# Patient Record
Sex: MALE | Race: WHITE | NOT HISPANIC OR LATINO | Employment: FULL TIME | URBAN - METROPOLITAN AREA
[De-identification: names, ages, dates, MRNs, and addresses within clinical notes are randomized per-mention and may not be internally consistent; named-entity substitution may affect disease eponyms.]

---

## 2023-10-14 ENCOUNTER — HOSPITAL ENCOUNTER (EMERGENCY)
Facility: HOSPITAL | Age: 63
Discharge: HOME | End: 2023-10-14
Attending: EMERGENCY MEDICINE
Payer: MEDICARE

## 2023-10-14 ENCOUNTER — APPOINTMENT (OUTPATIENT)
Dept: RADIOLOGY | Facility: HOSPITAL | Age: 63
End: 2023-10-14
Payer: MEDICARE

## 2023-10-14 VITALS
OXYGEN SATURATION: 99 % | DIASTOLIC BLOOD PRESSURE: 64 MMHG | HEART RATE: 67 BPM | WEIGHT: 190.26 LBS | RESPIRATION RATE: 16 BRPM | BODY MASS INDEX: 28.18 KG/M2 | HEIGHT: 69 IN | SYSTOLIC BLOOD PRESSURE: 123 MMHG

## 2023-10-14 DIAGNOSIS — N20.0 KIDNEY STONE: Primary | ICD-10-CM

## 2023-10-14 LAB
ALBUMIN SERPL-MCNC: 4.5 G/DL (ref 3.5–5)
ALP BLD-CCNC: 104 U/L (ref 35–125)
ALT SERPL-CCNC: 30 U/L (ref 5–40)
ANION GAP SERPL CALC-SCNC: 11 MMOL/L
APPEARANCE UR: ABNORMAL
AST SERPL-CCNC: 21 U/L (ref 5–40)
BASOPHILS # BLD AUTO: 0.03 X10*3/UL (ref 0–0.1)
BASOPHILS NFR BLD AUTO: 0.3 %
BILIRUB SERPL-MCNC: 0.3 MG/DL (ref 0.1–1.2)
BILIRUB UR STRIP.AUTO-MCNC: NEGATIVE MG/DL
BUN SERPL-MCNC: 9 MG/DL (ref 8–25)
CALCIUM SERPL-MCNC: 9.3 MG/DL (ref 8.5–10.4)
CHLORIDE SERPL-SCNC: 101 MMOL/L (ref 97–107)
CO2 SERPL-SCNC: 26 MMOL/L (ref 24–31)
COLOR UR: YELLOW
CREAT SERPL-MCNC: 1 MG/DL (ref 0.4–1.6)
EOSINOPHIL # BLD AUTO: 0.05 X10*3/UL (ref 0–0.7)
EOSINOPHIL NFR BLD AUTO: 0.6 %
ERYTHROCYTE [DISTWIDTH] IN BLOOD BY AUTOMATED COUNT: 12.5 % (ref 11.5–14.5)
GFR SERPL CREATININE-BSD FRML MDRD: 85 ML/MIN/1.73M*2
GLUCOSE SERPL-MCNC: 183 MG/DL (ref 65–99)
GLUCOSE UR STRIP.AUTO-MCNC: NORMAL MG/DL
HCT VFR BLD AUTO: 44.6 % (ref 41–52)
HGB BLD-MCNC: 15.1 G/DL (ref 13.5–17.5)
IMM GRANULOCYTES # BLD AUTO: 0.05 X10*3/UL (ref 0–0.7)
IMM GRANULOCYTES NFR BLD AUTO: 0.6 % (ref 0–0.9)
KETONES UR STRIP.AUTO-MCNC: NEGATIVE MG/DL
LEUKOCYTE ESTERASE UR QL STRIP.AUTO: NEGATIVE
LIPASE SERPL-CCNC: 34 U/L (ref 16–63)
LYMPHOCYTES # BLD AUTO: 1.03 X10*3/UL (ref 1.2–4.8)
LYMPHOCYTES NFR BLD AUTO: 11.3 %
MCH RBC QN AUTO: 30.1 PG (ref 26–34)
MCHC RBC AUTO-ENTMCNC: 33.9 G/DL (ref 32–36)
MCV RBC AUTO: 89 FL (ref 80–100)
MONOCYTES # BLD AUTO: 0.31 X10*3/UL (ref 0.1–1)
MONOCYTES NFR BLD AUTO: 3.4 %
NEUTROPHILS # BLD AUTO: 7.61 X10*3/UL (ref 1.2–7.7)
NEUTROPHILS NFR BLD AUTO: 83.8 %
NITRITE UR QL STRIP.AUTO: NEGATIVE
NRBC BLD-RTO: 0 /100 WBCS (ref 0–0)
PH UR STRIP.AUTO: 5.5 [PH]
PLATELET # BLD AUTO: 292 X10*3/UL (ref 150–450)
PMV BLD AUTO: 10 FL (ref 7.5–11.5)
POTASSIUM SERPL-SCNC: 4.3 MMOL/L (ref 3.4–5.1)
PROT SERPL-MCNC: 7.1 G/DL (ref 5.9–7.9)
PROT UR STRIP.AUTO-MCNC: ABNORMAL MG/DL
RBC # BLD AUTO: 5.02 X10*6/UL (ref 4.5–5.9)
RBC # UR STRIP.AUTO: ABNORMAL /UL
RBC #/AREA URNS AUTO: >20 /HPF
SODIUM SERPL-SCNC: 138 MMOL/L (ref 133–145)
SP GR UR STRIP.AUTO: 1.02
UROBILINOGEN UR STRIP.AUTO-MCNC: NORMAL MG/DL
WBC # BLD AUTO: 9.1 X10*3/UL (ref 4.4–11.3)
WBC #/AREA URNS AUTO: ABNORMAL /HPF

## 2023-10-14 PROCEDURE — 96374 THER/PROPH/DIAG INJ IV PUSH: CPT | Performed by: EMERGENCY MEDICINE

## 2023-10-14 PROCEDURE — 83690 ASSAY OF LIPASE: CPT | Performed by: EMERGENCY MEDICINE

## 2023-10-14 PROCEDURE — 81001 URINALYSIS AUTO W/SCOPE: CPT | Performed by: EMERGENCY MEDICINE

## 2023-10-14 PROCEDURE — 96361 HYDRATE IV INFUSION ADD-ON: CPT | Performed by: EMERGENCY MEDICINE

## 2023-10-14 PROCEDURE — 99284 EMERGENCY DEPT VISIT MOD MDM: CPT | Mod: 25 | Performed by: EMERGENCY MEDICINE

## 2023-10-14 PROCEDURE — 80053 COMPREHEN METABOLIC PANEL: CPT | Performed by: EMERGENCY MEDICINE

## 2023-10-14 PROCEDURE — 2500000004 HC RX 250 GENERAL PHARMACY W/ HCPCS (ALT 636 FOR OP/ED): Performed by: EMERGENCY MEDICINE

## 2023-10-14 PROCEDURE — 74177 CT ABD & PELVIS W/CONTRAST: CPT | Mod: ME

## 2023-10-14 PROCEDURE — 85025 COMPLETE CBC W/AUTO DIFF WBC: CPT | Performed by: EMERGENCY MEDICINE

## 2023-10-14 PROCEDURE — 2550000001 HC RX 255 CONTRASTS: Performed by: EMERGENCY MEDICINE

## 2023-10-14 PROCEDURE — 36415 COLL VENOUS BLD VENIPUNCTURE: CPT | Performed by: EMERGENCY MEDICINE

## 2023-10-14 RX ORDER — ONDANSETRON HYDROCHLORIDE 2 MG/ML
4 INJECTION, SOLUTION INTRAVENOUS ONCE
Status: COMPLETED | OUTPATIENT
Start: 2023-10-14 | End: 2023-10-14

## 2023-10-14 RX ORDER — MORPHINE SULFATE 4 MG/ML
4 INJECTION, SOLUTION INTRAMUSCULAR; INTRAVENOUS ONCE
Status: COMPLETED | OUTPATIENT
Start: 2023-10-14 | End: 2023-10-14

## 2023-10-14 RX ORDER — TAMSULOSIN HYDROCHLORIDE 0.4 MG/1
0.4 CAPSULE ORAL DAILY
Qty: 10 CAPSULE | Refills: 0 | Status: SHIPPED | OUTPATIENT
Start: 2023-10-14

## 2023-10-14 RX ORDER — ENOXAPARIN SODIUM 100 MG/ML
40 INJECTION SUBCUTANEOUS ONCE
Status: CANCELLED | OUTPATIENT
Start: 2023-10-14 | End: 2023-10-14

## 2023-10-14 RX ORDER — MORPHINE SULFATE 2 MG/ML
2 INJECTION, SOLUTION INTRAMUSCULAR; INTRAVENOUS EVERY 4 HOURS PRN
Status: CANCELLED | OUTPATIENT
Start: 2023-10-14

## 2023-10-14 RX ORDER — ACETAMINOPHEN 325 MG/1
650 TABLET ORAL EVERY 6 HOURS PRN
Status: CANCELLED | OUTPATIENT
Start: 2023-10-14

## 2023-10-14 RX ADMIN — MORPHINE SULFATE 4 MG: 4 INJECTION, SOLUTION INTRAMUSCULAR; INTRAVENOUS at 15:33

## 2023-10-14 RX ADMIN — ONDANSETRON 4 MG: 2 INJECTION INTRAMUSCULAR; INTRAVENOUS at 15:31

## 2023-10-14 RX ADMIN — SODIUM CHLORIDE 1000 ML: 900 INJECTION, SOLUTION INTRAVENOUS at 15:31

## 2023-10-14 RX ADMIN — IOHEXOL 75 ML: 350 INJECTION, SOLUTION INTRAVENOUS at 16:38

## 2023-10-14 ASSESSMENT — COLUMBIA-SUICIDE SEVERITY RATING SCALE - C-SSRS
1. IN THE PAST MONTH, HAVE YOU WISHED YOU WERE DEAD OR WISHED YOU COULD GO TO SLEEP AND NOT WAKE UP?: NO
2. HAVE YOU ACTUALLY HAD ANY THOUGHTS OF KILLING YOURSELF?: NO
6. HAVE YOU EVER DONE ANYTHING, STARTED TO DO ANYTHING, OR PREPARED TO DO ANYTHING TO END YOUR LIFE?: NO

## 2023-10-14 ASSESSMENT — PAIN SCALES - GENERAL
PAINLEVEL_OUTOF10: 0 - NO PAIN
PAINLEVEL_OUTOF10: 8
PAINLEVEL_OUTOF10: 3
PAINLEVEL_OUTOF10: 7

## 2023-10-14 ASSESSMENT — PAIN - FUNCTIONAL ASSESSMENT
PAIN_FUNCTIONAL_ASSESSMENT: 0-10
PAIN_FUNCTIONAL_ASSESSMENT: 0-10

## 2023-10-14 ASSESSMENT — PAIN DESCRIPTION - ONSET: ONSET: SUDDEN

## 2023-10-14 ASSESSMENT — PAIN DESCRIPTION - ORIENTATION: ORIENTATION: RIGHT

## 2023-10-14 ASSESSMENT — PAIN DESCRIPTION - LOCATION: LOCATION: ABDOMEN

## 2023-10-14 ASSESSMENT — PAIN DESCRIPTION - FREQUENCY: FREQUENCY: CONSTANT/CONTINUOUS

## 2023-10-14 ASSESSMENT — PAIN DESCRIPTION - DESCRIPTORS
DESCRIPTORS: SHARP;OTHER (COMMENT)
DESCRIPTORS: OTHER (COMMENT);SHARP

## 2023-10-14 ASSESSMENT — PAIN DESCRIPTION - PROGRESSION: CLINICAL_PROGRESSION: NOT CHANGED

## 2023-10-14 ASSESSMENT — PAIN DESCRIPTION - PAIN TYPE: TYPE: ACUTE PAIN

## 2023-10-14 NOTE — DISCHARGE INSTRUCTIONS
Drink fluids.  You can take Motrin or Tylenol every 6 hours for pain.    Take Flomax for up to 10 days.    You should follow-up with your urologist next week if you continue to have symptoms.    Return to the ER if your symptoms do not get better in 2 days or get worse including worse pain, pain moving to the right lower abdomen, fever or vomiting.

## 2023-10-14 NOTE — ED PROVIDER NOTES
HPI   Chief Complaint   Patient presents with    Abdominal Pain     This morning I started having lrq pain the pain goes into my rt back the pain is sharp and heavy  no blood in my pee or poop and also and nausea       83-year-old male with a history of kidney stones presents ER with sudden onset of right lower quadrant, right flank and right groin pain.  Pain was severe at onset.  10 out of 10.  Nothing made it better or worse.  Associated nausea.  No vomiting.  No diarrhea.  No fever.  No urinary symptoms including dysuria, frequency or hematuria.  Patient is not anticoagulated.  Does not have a gallbladder.  No epigastric or left lower quadrant pain.  No abdominal surgeries.  No back pain.    Pain has completely resolved.    No chest pain or shortness of breath.  No fever.                          No data recorded                Patient History   Past Medical History:   Diagnosis Date    Personal history of other diseases of the circulatory system 04/17/2015    History of cardiac disorder     Past Surgical History:   Procedure Laterality Date    CARDIAC SURGERY  04/17/2015    Heart Surgery    CORONARY ANGIOPLASTY WITH STENT PLACEMENT  08/20/2015    Cath Placement Of Stent 1    CORONARY ARTERY BYPASS GRAFT  01/04/2016    CABG    OTHER SURGICAL HISTORY  08/20/2015    Cath Placement Of Stent 2     No family history on file.  Social History     Tobacco Use    Smoking status: Not on file    Smokeless tobacco: Not on file   Substance Use Topics    Alcohol use: Not on file    Drug use: Not on file       Physical Exam   ED Triage Vitals [10/14/23 1440]   Temp Heart Rate Resp BP   -- 76 18 120/75      SpO2 Temp Source Heart Rate Source Patient Position   98 % Oral Monitor Sitting      BP Location FiO2 (%)     Left arm --       Physical Exam  Vitals and nursing note reviewed.     Constitutional: Well appearing and hydrated. Awake & alert. No acute distress.  Head: Atraumatic.  Eyes: Sclerae are anicteric and not  injected.  ENT: Airway is patent.  Neck: Neck is supple and non-tender. No stridor.  Cardiovascular: Regular rate.  Pulmonary/Chest: Clear to auscultation bilaterally. No distress.  GI: Soft and non-distended. There is no discomfort with palpation. No rebound, guarding, or rigidity.  Flank tenderness  : No inguinal tenderness or swelling.   Extremities: Full range of motion in all extremities and no deformity.  Neurological: Alert, awake.  Moving all extremities.  Skin: Skin is warm and dry.  Psychiatric: Cooperative.    ED Course & MDM   ED Course as of 10/14/23 1823   Sat Oct 14, 2023   1811 Pain and nausea completely resolved.  Discussed all test results with patient.  Patient home with Flomax.  Agreed with plan [RF]      ED Course User Index  [RF] Arnav Sheppard MD         Diagnoses as of 10/14/23 1823   Kidney stone       Medical Decision Making  Abdominal Pain: The patient presented with abdominal pain right lower quadrant rating to the right flank and right groin A CT scan was performed to evaluate the abdomen. The history, clinical exam, labs, and imaging have not identified an emergent  or life-threatening cause for the abdominal pain. Given the benign exam, the laboratory studies, and unremarkable CT, I have a very low suspicion for any life-threatening disease. Patient's symptoms improved after ED medication.     Based on the history, physical exam and CT result, I do not believe patient has a SBO, ileus, colitis, diverticulitis, appendicitis, significant systemic disease or sepsis.      Patient has signs of a kidney stone including hematuria.  No signs of obstruction or infection.  Patient's pain has resolved after treatment in the ER.  He will continue Flomax.  He has a history of kidney stones and a urologist.  He will follow-up with his urologist next week if he continues to have symptoms.    He will return to the ER if his symptoms do not improve in 2 days or get worse including worse  pain, pain focused in the right lower quadrant, vomiting or fever that might suggest alternative such as appendicitis.    They will drink fluids and advance diet as tolerated. Will take oral pain medications. I have discussed with the patient the level of uncertainty with undifferentiated abdominal pain and clearly explained the need to follow-up as noted on the discharge instructions, or return to the Emergency Department immediately if the pain worsens, develops fever, persistent and uncontrollable vomiting, or for any new symptoms or concerns.        Procedure  Procedures     Arnav Sheppard MD  10/14/23 7746

## 2023-10-14 NOTE — ED TRIAGE NOTES
HPI   Chief Complaint   Patient presents with    Abdominal Pain     This morning I started having lrq pain the pain goes into my rt back the pain is sharp and heavy  no blood in my pee or poop and also and nausea       I SAW THIS PATIENT VERY BRIEFLY AS THE PROVIDER IN TRIAGE TO ESTABLISH ACUITY AND DEVELOP A BASIC PLAN OF CARE.  A MORE THOROUGH HISTORY AND PHYSICAL EXAM WILL BE DOCUMENTED BY THE TREATING PHYSICIAN AND/OR FIFI.    The patient is a 63-year-old male who presents for evaluation of right lower quadrant abdominal pain.  The pain began abruptly about 2 hours prior to arrival.  The pain radiates into the right lateral flank area.  The patient has had nausea and some vomiting with this.  No fevers.  Denies any diarrhea, melena or hematochezia.  No hematemesis.  Past surgical history includes cholecystectomy.    Triage vitals are unremarkable.  The patient appears uncomfortable.  Lungs are clear to auscultation bilaterally.  Heart rate is in the 70s with regular rhythm and no murmurs.  Abdomen is soft and nondistended and tender with palpation in the right lower quadrant and right lateral abdomen.  No CVA tenderness with percussion.  There is a negative Rovsing sign.  There is no guarding or rigidity.    Plan is for IV fluids, IV morphine and Zofran, labs and CT abdomen and pelvis.  The patient will have further assessment and evaluation by a provider in the emergency department.    Keaton Rodrigues D.O.  2:49 PM                          No data recorded                Patient History   Past Medical History:   Diagnosis Date    Personal history of other diseases of the circulatory system 04/17/2015    History of cardiac disorder     Past Surgical History:   Procedure Laterality Date    CARDIAC SURGERY  04/17/2015    Heart Surgery    CORONARY ANGIOPLASTY WITH STENT PLACEMENT  08/20/2015    Cath Placement Of Stent 1    CORONARY ARTERY BYPASS GRAFT  01/04/2016    CABG    OTHER SURGICAL HISTORY  08/20/2015    Cath  Home Infusion  Pt is hoping to discharge tomorrow and will be going home on bolus enteral feeds.  Pt has never done home enteral therapy before however Pt has been to the [facility] for teaching and has done some of her own enteral feed via gravity at bedside.  Benefits for home enteral therapy have been checked and pt will have 65/35 coverage until meets OOP of $9100 (has met $7364) through their BCBS policy subject to meeting their deductible and OOP expenses.    Met with pt and at bedside to relay benefits and offer choice of agency for the enteral supplies. Pt also requested that this writer also contact her  and inform him of the same information.  Pt stated she would like to use I.   Provided pt with information about [facility] services.  Informed pt that [facility] will be providing the formula and all supplies needed and that [facility] will be providing home nursing.  Inquired about administration method preferred (gravity vs syringe) and inventoried supply needs.  Explained about delivery of supplies, storage of formula, and 24/7 availability of I and her staff while on enteral therapy.   [facility] will deliver formula and supplies to the hospital on day of discharge and [facility] will contact her to set up nurse visits.    Pt and pt's  verbalized understanding of all information given.  Pt and  is willing and able to learn and manage home enteral therapy  Questions answered.   Will continue to follow and update pt as appropriate.     Thank you for the referral.    Carmel Torres LPN  Enteral Nurse Liaison  [facility]  [address] [place] [address]  [phone]   Placement Of Stent 2     No family history on file.  Social History     Tobacco Use    Smoking status: Not on file    Smokeless tobacco: Not on file   Substance Use Topics    Alcohol use: Not on file    Drug use: Not on file       Physical Exam   ED Triage Vitals [10/14/23 1440]   Temp Heart Rate Resp BP   -- 76 18 120/75      SpO2 Temp Source Heart Rate Source Patient Position   98 % Oral Monitor Sitting      BP Location FiO2 (%)     Left arm --       Physical Exam    ED Course & MDM   ED Course as of 10/15/23 1109   Sat Oct 14, 2023   1811 Pain and nausea completely resolved.  Discussed all test results with patient.  Patient home with [facility].  Agreed with plan [RF]      ED Course User Index  [RF] Arnav Sheppard MD         Diagnoses as of 10/15/23 1109   Kidney stone       Medical Decision Making      Procedure  Procedures

## 2024-08-01 ENCOUNTER — APPOINTMENT (OUTPATIENT)
Dept: GASTROENTEROLOGY | Facility: CLINIC | Age: 64
End: 2024-08-01
Payer: MEDICARE

## 2024-08-06 ENCOUNTER — APPOINTMENT (OUTPATIENT)
Dept: GASTROENTEROLOGY | Facility: CLINIC | Age: 64
End: 2024-08-06
Payer: MEDICARE

## 2024-08-22 ENCOUNTER — APPOINTMENT (OUTPATIENT)
Dept: GASTROENTEROLOGY | Facility: HOSPITAL | Age: 64
End: 2024-08-22
Payer: MEDICARE

## 2025-01-15 PROBLEM — I25.10 ARTERIOSCLEROSIS OF CORONARY ARTERY: Chronic | Status: ACTIVE | Noted: 2018-08-16

## 2025-01-15 PROBLEM — E11.40 TYPE 2 DIABETES MELLITUS WITH DIABETIC NEUROPATHY, WITHOUT LONG-TERM CURRENT USE OF INSULIN: Status: ACTIVE | Noted: 2024-12-10

## 2025-01-15 PROBLEM — M10.9 GOUT: Status: ACTIVE | Noted: 2025-01-15

## 2025-01-15 PROBLEM — F41.9 ANXIETY: Status: ACTIVE | Noted: 2025-01-15

## 2025-01-15 PROBLEM — K80.20 CHOLELITHIASIS: Status: ACTIVE | Noted: 2025-01-15

## 2025-01-15 PROBLEM — I10 HYPERTENSION: Status: ACTIVE | Noted: 2025-01-15

## 2025-01-15 PROBLEM — R10.13 DYSPEPSIA: Status: ACTIVE | Noted: 2025-01-15

## 2025-01-15 PROBLEM — K58.9 IBS (IRRITABLE BOWEL SYNDROME): Status: ACTIVE | Noted: 2025-01-15

## 2025-01-15 PROBLEM — E11.9 DIABETES MELLITUS (MULTI): Status: ACTIVE | Noted: 2025-01-15

## 2025-01-15 PROBLEM — E03.9 HYPOTHYROIDISM: Status: ACTIVE | Noted: 2025-01-15

## 2025-01-15 PROBLEM — G47.00 INSOMNIA: Status: ACTIVE | Noted: 2025-01-15

## 2025-02-03 ENCOUNTER — APPOINTMENT (OUTPATIENT)
Dept: ENDOCRINOLOGY | Facility: CLINIC | Age: 65
End: 2025-02-03
Payer: MEDICARE

## 2025-02-21 ENCOUNTER — TELEPHONE (OUTPATIENT)
Dept: PRIMARY CARE | Facility: CLINIC | Age: 65
End: 2025-02-21
Payer: MEDICARE

## 2025-05-30 ENCOUNTER — PRE-ADMISSION TESTING (OUTPATIENT)
Dept: PREADMISSION TESTING | Facility: HOSPITAL | Age: 65
End: 2025-05-30
Payer: MEDICARE

## 2025-05-30 VITALS
WEIGHT: 193.56 LBS | TEMPERATURE: 96.1 F | DIASTOLIC BLOOD PRESSURE: 87 MMHG | OXYGEN SATURATION: 100 % | BODY MASS INDEX: 27.71 KG/M2 | HEART RATE: 84 BPM | HEIGHT: 70 IN | RESPIRATION RATE: 18 BRPM | SYSTOLIC BLOOD PRESSURE: 135 MMHG

## 2025-05-30 DIAGNOSIS — Z01.818 PRE-OP EXAMINATION: Primary | ICD-10-CM

## 2025-05-30 LAB
ATRIAL RATE: 79 BPM
P AXIS: 42 DEGREES
P OFFSET: 200 MS
P ONSET: 152 MS
PR INTERVAL: 138 MS
Q ONSET: 221 MS
QRS COUNT: 13 BEATS
QRS DURATION: 88 MS
QT INTERVAL: 386 MS
QTC CALCULATION(BAZETT): 442 MS
QTC FREDERICIA: 423 MS
R AXIS: 6 DEGREES
T AXIS: 34 DEGREES
T OFFSET: 414 MS
VENTRICULAR RATE: 79 BPM

## 2025-05-30 PROCEDURE — 99214 OFFICE O/P EST MOD 30 MIN: CPT

## 2025-05-30 PROCEDURE — 93005 ELECTROCARDIOGRAM TRACING: CPT

## 2025-05-30 PROCEDURE — 93010 ELECTROCARDIOGRAM REPORT: CPT | Performed by: INTERNAL MEDICINE

## 2025-05-30 RX ORDER — UBIDECARENONE 30 MG
30 CAPSULE ORAL DAILY
COMMUNITY

## 2025-05-30 RX ORDER — MULTIVIT-MIN/IRON FUM/FOLIC AC 7.5 MG-4
1 TABLET ORAL DAILY
COMMUNITY

## 2025-05-30 RX ORDER — EVENING PRIMROSE OIL 500 MG
45 CAPSULE ORAL EVERY OTHER DAY
COMMUNITY

## 2025-05-30 RX ORDER — EZETIMIBE 10 MG/1
10 TABLET ORAL DAILY
COMMUNITY

## 2025-05-30 RX ORDER — TURMERIC 400 MG
1 CAPSULE ORAL DAILY
COMMUNITY

## 2025-05-30 ASSESSMENT — PAIN DESCRIPTION - DESCRIPTORS: DESCRIPTORS: PRESSURE

## 2025-05-30 ASSESSMENT — ENCOUNTER SYMPTOMS
CONSTITUTIONAL NEGATIVE: 1
ABDOMINAL DISTENTION: 1
TROUBLE SWALLOWING: 1
CONSTIPATION: 1
EYES NEGATIVE: 1
ABDOMINAL PAIN: 1
MUSCULOSKELETAL NEGATIVE: 1
BLOOD IN STOOL: 1
ALLERGIC/IMMUNOLOGIC NEGATIVE: 1
NEUROLOGICAL NEGATIVE: 1
CARDIOVASCULAR NEGATIVE: 1
RESPIRATORY NEGATIVE: 1
NAUSEA: 1
ENDOCRINE NEGATIVE: 1
DIARRHEA: 1
PSYCHIATRIC NEGATIVE: 1
HEMATOLOGIC/LYMPHATIC NEGATIVE: 1

## 2025-05-30 ASSESSMENT — PAIN - FUNCTIONAL ASSESSMENT: PAIN_FUNCTIONAL_ASSESSMENT: 0-10

## 2025-05-30 ASSESSMENT — DUKE ACTIVITY SCORE INDEX (DASI)
CAN YOU CLIMB A FLIGHT OF STAIRS OR WALK UP A HILL: YES
DASI METS SCORE: 7.3
CAN YOU TAKE CARE OF YOURSELF (EAT, DRESS, BATHE, OR USE TOILET): YES
CAN YOU RUN A SHORT DISTANCE: YES
CAN YOU DO MODERATE WORK AROUND THE HOUSE LIKE VACUUMING, SWEEPING FLOORS OR CARRYING GROCERIES: YES
CAN YOU HAVE SEXUAL RELATIONS: YES
CAN YOU PARTICIPATE IN STRENOUS SPORTS LIKE SWIMMING, SINGLES TENNIS, FOOTBALL, BASKETBALL, OR SKIING: NO
CAN YOU DO LIGHT WORK AROUND THE HOUSE LIKE DUSTING OR WASHING DISHES: YES
CAN YOU DO YARD WORK LIKE RAKING LEAVES, WEEDING OR PUSHING A MOWER: YES
CAN YOU PARTICIPATE IN MODERATE RECREATIONAL ACTIVITIES LIKE GOLF, BOWLING, DANCING, DOUBLES TENNIS OR THROWING A BASEBALL OR FOOTBALL: NO
TOTAL_SCORE: 36.7
CAN YOU WALK INDOORS, SUCH AS AROUND YOUR HOUSE: YES
CAN YOU WALK A BLOCK OR TWO ON LEVEL GROUND: YES
CAN YOU DO HEAVY WORK AROUND THE HOUSE LIKE SCRUBBING FLOORS OR LIFTING AND MOVING HEAVY FURNITURE: NO

## 2025-05-30 ASSESSMENT — PAIN SCALES - GENERAL: PAINLEVEL_OUTOF10: 0 - NO PAIN

## 2025-05-30 NOTE — PREPROCEDURE INSTRUCTIONS
Medication List            Accurate as of May 30, 2025 12:51 PM. Always use your most recent med list.                Accu-Chek Guide test strips  Generic drug: blood sugar diagnostic     Accu-Chek Softclix Lancets misc  Generic drug: lancets     allopurinol 300 mg tablet  Commonly known as: Zyloprim  Medication Adjustments for Surgery: Take/Use as prescribed     aspirin 81 mg chewable tablet  Additional Medication Adjustments for Surgery: DO NOT TAKE DAY OF SURGERY     clopidogrel 75 mg tablet  Commonly known as: Plavix  Additional Medication Adjustments for Surgery: Take last dose 5 days before surgery  Notes to patient: LAST TAKEN 2 TO 3 MONTHS AGO     co-enzyme Q-10 30 mg capsule  Additional Medication Adjustments for Surgery: Take last dose 7 days before surgery     empagliflozin 10 mg tablet  Commonly known as: Jardiance  Medication Adjustments for Surgery: Take last dose 3 days before surgery     ezetimibe 10 mg tablet  Commonly known as: Zetia  Medication Adjustments for Surgery: Take last dose 1 day (24 hours) before surgery     FISH OIL-DHA-EPA ORAL  Additional Medication Adjustments for Surgery: Take last dose 7 days before surgery     VINCE OIL ORAL  Additional Medication Adjustments for Surgery: Take last dose 7 days before surgery     glipiZIDE 10 mg tablet  Commonly known as: Glucotrol  Medication Adjustments for Surgery: Do Not take on the morning of surgery     metoprolol succinate XL 25 mg 24 hr tablet  Commonly known as: Toprol-XL  Medication Adjustments for Surgery: Take on the morning of surgery     multivitamin with minerals tablet  Additional Medication Adjustments for Surgery: Take last dose 7 days before surgery     rosuvastatin 40 mg tablet  Commonly known as: Crestor  Medication Adjustments for Surgery: Take/Use as prescribed     turmeric 400 mg capsule  Additional Medication Adjustments for Surgery: Take last dose 7 days before surgery     VITAMIN D2-VITAMIN K1 ORAL  Additional  Medication Adjustments for Surgery: Take last dose 7 days before surgery     vitamin E 45 mg (100 units) capsule  Additional Medication Adjustments for Surgery: Take last dose 7 days before surgery                 [facility] COLONOSCOPY PRINTED PREP INSTRUCTIONS PROVIDED             NPO Instructions:    FOLLOW COLONOSCOPY INSTRUCTIONS PROVIDED    Additional Instructions:     Day of Surgery:  Review your medication instructions, take indicated medications  Wear  comfortable loose fitting clothing  Do not use moisturizers, creams, lotions or perfume  All jewelry and valuables should be left at home      PAT DISCHARGE INSTRUCTIONS    The Same Day Surgery (SDS) Department of the hospital where your procedure will be performed will contact you after 2:00 PM the day before your surgery. If you are scheduled on a Monday, or a Tuesday following a Monday holiday, they will call on the last business day prior to your surgery.  Please check your voicemail for any missed messages.    CALL FRIDAY TODAY    [facility]  [address] [place] [address], [address] [phone]  Second Floor  *PLEASE CALL ABOVE NUMBER FOR ARRIVAL TIME       Please let your surgeon know if:      You develop any open sores, shingles, burning or painful urination as these may increase your risk of an infection.   You no longer wish to have the surgery.   Any other personal circumstances change that may lead to the need to cancel or defer this surgery-such as being sick or getting admitted to any hospital within one week of your planned procedure.    Your contact details change, such as a change of address or phone number.    Starting now:     Please DO NOT drink alcohol or smoke for 24 hours before surgery. It is well known that quitting smoking can make a huge difference to your health and recovery from surgery. The longer you abstain from smoking, the better your chances of a  healthy recovery. If you need help with quitting, call 3-336-QUIT-NOW to be connected to a trained counselor who will discuss the best methods to help you quit.     Before your surgery:    Please stop all supplements 7 days prior to surgery. Or as directed by your surgeon.   Please stop taking NSAID pain medicine such as Advil and Motrin 7 days before surgery.    If you develop any fever, cough, cold, rashes, cuts, scratches, scrapes, urinary symptoms or infection anywhere on your body (including teeth and gums) prior to surgery, please call your surgeon’s office as soon as possible. This may require treatment to reduce the chance of cancellation on the day of surgery.    The day before your surgery:   DIET- Please follow the diet instructions at the top of your packet.   Get a good night’s rest.  Use the special soap for bathing if you have been instructed to use one.    Scheduled surgery times may change and you will be notified if this occurs - please check your personal voicemail for any updates.     On the morning of surgery:   Wear comfortable, loose fitting clothes which open in the front. Please do not wear moisturizers, creams, lotions, makeup or perfume.    Please bring with you to surgery:   Photo ID and insurance card   Current list of medicines and allergies   Pacemaker/ Defibrillator/Heart stent cards   CPAP machine and mask    Slings/ splints/ crutches   A copy of your complete advanced directive/DHPOA.    Please do NOT bring with you to surgery:   All jewelry and valuables should be left at home.   Prosthetic devices such as contact lenses, hearing aids, dentures, eyelash extensions, hairpins and body piercings must be removed prior to going in to the surgical suite.    After outpatient surgery:   A responsible adult MUST accompany you at the time of discharge and stay with you for 24 hours after your surgery. You may NOT drive yourself home after surgery.    Do not drive, operate machinery, make  critical decisions or do activities that require co-ordination or balance until after a night’s sleep.   Do not drink alcoholic beverages for 24 hours.   Instructions for resuming your medications will be provided by your surgeon.    CALL YOUR DOCTOR AFTER SURGERY IF YOU HAVE:     Chills and/or a fever of 101° F or higher.    Redness, swelling, pus or drainage from your surgical wound or a bad smell from the wound.    Lightheadedness, fainting or confusion.    Persistent vomiting (throwing up) and are not able to eat or drink for 12 hours.    Three or more loose, watery bowel movements in 24 hours (diarrhea).   Difficulty or pain while urinating( after non-urological surgery)    Pain and swelling in your legs, especially if it is only on one side.    Difficulty breathing or are breathing faster than normal.    Any new concerning symptoms.

## 2025-05-30 NOTE — CPM/PAT H&P
CPM/PAT Evaluation       Name: Maicol Kirby Jr. (Maicol Kirby Jr.)  /Age: 1960/65 y.o.     In-Person       Chief Complaint: EGD/Colonoscopy    HPI: Maicol Kirby is a 65 year old male who is scheduled for a EGD and colonoscopy. He states he had a colonoscopy almost two years ago and a few polyps were removed. He states he has bleeding with bowel movements. He states he has constipation, diarrhea, and nausea. He has complaints of pressure in his abdomen. He states he also has trouble swallowing and occasionally will choke on food.  He denies fever, chills, chest pain. SOB, dizziness, and palpitations.     Medical History[1]    Surgical History[2]    Social History     Tobacco Use    Smoking status: Never    Smokeless tobacco: Never   Substance Use Topics    Alcohol use: Never     Social History     Substance and Sexual Activity   Drug Use Never           Family History[3]    Allergies[4]  Current Medications[5]       Review of Systems   Constitutional: Negative.    HENT:  Positive for trouble swallowing.    Eyes: Negative.    Respiratory: Negative.     Cardiovascular: Negative.    Gastrointestinal:  Positive for abdominal distention, abdominal pain, blood in stool, constipation, diarrhea and nausea.   Endocrine: Negative.    Genitourinary: Negative.    Musculoskeletal: Negative.    Skin: Negative.    Allergic/Immunologic: Negative.    Neurological: Negative.    Hematological: Negative.    Psychiatric/Behavioral: Negative.             Physical Exam  Vitals reviewed.   Constitutional:       Appearance: Normal appearance.   HENT:      Head: Normocephalic and atraumatic.      Nose: Nose normal.      Mouth/Throat:      Mouth: Mucous membranes are moist.      Pharynx: Oropharynx is clear.   Eyes:      Extraocular Movements: Extraocular movements intact.      Conjunctiva/sclera: Conjunctivae normal.   Cardiovascular:      Rate and Rhythm: Normal rate and regular rhythm.      Pulses: Normal  "pulses.      Heart sounds: Normal heart sounds.   Pulmonary:      Effort: Pulmonary effort is normal.      Breath sounds: Normal breath sounds.   Abdominal:      General: Bowel sounds are normal.      Palpations: Abdomen is soft.   Genitourinary:     Comments: Assessment deferred to physician  Musculoskeletal:         General: Normal range of motion.      Cervical back: Normal range of motion and neck supple.   Skin:     General: Skin is warm and dry.   Neurological:      General: No focal deficit present.      Mental Status: He is alert and oriented to person, place, and time.   Psychiatric:         Mood and Affect: Mood normal.         Behavior: Behavior normal.         Thought Content: Thought content normal.         Judgment: Judgment normal.          [facility] AIRWAY:   Airway:     Mallampati::  I    TM distance::  >3 FB    Neck ROM::  Full  normal          Visit Vitals  BP (!) 154/95   Pulse 84   Temp 35.6 °C (96.1 °F) (Temporal)   Resp 18   Ht 1.765 m (5' 9.5\")   Wt 87.8 kg (193 lb 9 oz)   SpO2 100%   BMI 28.17 kg/m²   Smoking Status Never   BSA 2.07 m²     ASA: III  CHADS2: 8.5%  RCRI: 6.6  DASI: 36.7  METS: 7.3  STOP BAN          Assessment and Plan:     EGD/ Colonoscopy  CAD/MI:  Aspirin, Plavix, S/P CABG in  (LIMA to LAD, VG to CX and VG to RCA), and S/P PCI to OM 2 and OM 3   Diabetes: Jardiance, Glipizide,   Hypertension: Metoprolol  Hyperlipidemia: Rosuvastatin, Zetia, Fish oil  GOUT: Allopurinol  TREVON: No C-PAP  Hx of TIA    LABS: 3/19/25  EKG done in [facility] similar to previous EKGs  Echo 24  ast ECHO Result Conclusion         ECHO  Collected: 2024 11:29 AM (Final result)   Impression: CONCLUSIONS:  - Exam indication: Chest Pain  - The left ventricle is normal in size. Left ventricular systolic function is   normal. EF = 57 ± 5% (2D biplane) Normal left ventricular diastolic function.  - The right ventricle is normal in size. Right ventricular systolic function is   normal.  - Estimated right " ventricular systolic pressure is 31 mmHg consistent with normal   pulmonary artery pressures. Estimated right atrial pressure is 3 mmHg based on IVC   assessment.  - Exam was compared with the prior  echocardiographic exam performed on 4/2/2021   (stress echo). Similar findings.        Electronically signed by Ran Donnelly MD on 2/23/2024 at 5:32:52 PM        Ledy Sherwood, APRN-CNP         [1]   Past Medical History:  Diagnosis Date    Personal history of other diseases of the circulatory system 04/17/2015    History of cardiac disorder   [2]   Past Surgical History:  Procedure Laterality Date    CARDIAC SURGERY  04/17/2015    Heart Surgery    CORONARY ANGIOPLASTY WITH STENT PLACEMENT  08/20/2015    Cath Placement Of Stent 1    CORONARY ARTERY BYPASS GRAFT  01/04/2016    CABG    OTHER SURGICAL HISTORY  08/20/2015    Cath Placement Of Stent 2   [3] No family history on file.  [4] No Known Allergies  [5]   Current Outpatient Medications   Medication Sig Dispense Refill    clopidogrel (Plavix) 75 mg tablet Take 1 tablet (75 mg) by mouth once daily.      Accu-Chek Guide test strips strip TEST BLOOD SUGAR FOUR TIMES DAILY AS DIRECTED      Accu-Chek Softclix Lancets misc       allopurinol (Zyloprim) 300 mg tablet Take 1 tablet (300 mg) by mouth once daily as needed.      aspirin 81 mg chewable tablet once every 24 hours.      co-enzyme Q-10 30 mg capsule Take 1 capsule (30 mg) by mouth once daily.      empagliflozin (Jardiance) 10 mg Take 1 tablet (10 mg) by mouth once daily with breakfast.      ezetimibe (Zetia) 10 mg tablet Take 1 tablet (10 mg) by mouth once daily.      FISH OIL-DHA-EPA ORAL Take 1 capsule by mouth once daily.      GINGER OIL ORAL Take 1 Capful by mouth once daily.      glipiZIDE (Glucotrol) 10 mg tablet Take 1 tablet (10 mg) by mouth 2 times a day before meals.      metoprolol succinate XL (Toprol-XL) 25 mg 24 hr tablet Take 1 tablet (25 mg) by mouth once daily.      multivitamin with  minerals tablet Take 1 tablet by mouth once daily.      rosuvastatin (Crestor) 40 mg tablet Take 1 tablet (40 mg) by mouth once daily at bedtime.      turmeric 400 mg capsule Take 1 tablet by mouth once daily.      VITAMIN D2-VITAMIN K1 ORAL Take 1 capsule by mouth once daily.      vitamin E 45 mg (100 units) capsule Take 1 capsule (45 mg) by mouth every other day.       No current facility-administered medications for this visit.

## 2025-06-02 ENCOUNTER — HOSPITAL ENCOUNTER (OUTPATIENT)
Dept: GASTROENTEROLOGY | Facility: HOSPITAL | Age: 65
Discharge: HOME | End: 2025-06-02
Payer: MEDICARE

## 2025-06-02 ENCOUNTER — ANESTHESIA EVENT (OUTPATIENT)
Dept: GASTROENTEROLOGY | Facility: HOSPITAL | Age: 65
End: 2025-06-02
Payer: MEDICARE

## 2025-06-02 ENCOUNTER — ANESTHESIA (OUTPATIENT)
Dept: GASTROENTEROLOGY | Facility: HOSPITAL | Age: 65
End: 2025-06-02
Payer: MEDICARE

## 2025-06-02 VITALS
TEMPERATURE: 96.4 F | DIASTOLIC BLOOD PRESSURE: 76 MMHG | OXYGEN SATURATION: 99 % | RESPIRATION RATE: 16 BRPM | HEART RATE: 61 BPM | SYSTOLIC BLOOD PRESSURE: 132 MMHG

## 2025-06-02 DIAGNOSIS — R13.10 DYSPHAGIA, UNSPECIFIED TYPE: ICD-10-CM

## 2025-06-02 DIAGNOSIS — R14.0 BLOATING: ICD-10-CM

## 2025-06-02 DIAGNOSIS — K62.5 RECTAL BLEED: ICD-10-CM

## 2025-06-02 DIAGNOSIS — Z86.0100 HISTORY OF COLON POLYPS: ICD-10-CM

## 2025-06-02 PROBLEM — Z95.1 S/P CABG (CORONARY ARTERY BYPASS GRAFT): Status: ACTIVE | Noted: 2025-06-02

## 2025-06-02 LAB — GLUCOSE BLD MANUAL STRIP-MCNC: 118 MG/DL (ref 74–99)

## 2025-06-02 PROCEDURE — 2500000004 HC RX 250 GENERAL PHARMACY W/ HCPCS (ALT 636 FOR OP/ED): Performed by: ANESTHESIOLOGIST ASSISTANT

## 2025-06-02 PROCEDURE — 7100000009 HC PHASE TWO TIME - INITIAL BASE CHARGE

## 2025-06-02 PROCEDURE — 2500000004 HC RX 250 GENERAL PHARMACY W/ HCPCS (ALT 636 FOR OP/ED): Performed by: INTERNAL MEDICINE

## 2025-06-02 PROCEDURE — 3700000002 HC GENERAL ANESTHESIA TIME - EACH INCREMENTAL 1 MINUTE

## 2025-06-02 PROCEDURE — 82947 ASSAY GLUCOSE BLOOD QUANT: CPT

## 2025-06-02 PROCEDURE — A45385 PR COLONOSCOPY,REMV LESN,SNARE: Performed by: ANESTHESIOLOGIST ASSISTANT

## 2025-06-02 PROCEDURE — 7100000010 HC PHASE TWO TIME - EACH INCREMENTAL 1 MINUTE

## 2025-06-02 PROCEDURE — 3700000001 HC GENERAL ANESTHESIA TIME - INITIAL BASE CHARGE

## 2025-06-02 PROCEDURE — A45385 PR COLONOSCOPY,REMV LESN,SNARE: Performed by: ANESTHESIOLOGY

## 2025-06-02 PROCEDURE — 7100000001 HC RECOVERY ROOM TIME - INITIAL BASE CHARGE

## 2025-06-02 PROCEDURE — 45385 COLONOSCOPY W/LESION REMOVAL: CPT | Performed by: INTERNAL MEDICINE

## 2025-06-02 PROCEDURE — 7100000002 HC RECOVERY ROOM TIME - EACH INCREMENTAL 1 MINUTE

## 2025-06-02 PROCEDURE — 43235 EGD DIAGNOSTIC BRUSH WASH: CPT | Performed by: INTERNAL MEDICINE

## 2025-06-02 RX ORDER — OXYCODONE HYDROCHLORIDE 5 MG/1
5 TABLET ORAL EVERY 4 HOURS PRN
Status: ACTIVE | OUTPATIENT
Start: 2025-06-02 | End: 2025-06-02

## 2025-06-02 RX ORDER — ONDANSETRON HYDROCHLORIDE 2 MG/ML
4 INJECTION, SOLUTION INTRAVENOUS ONCE AS NEEDED
Status: ACTIVE | OUTPATIENT
Start: 2025-06-02 | End: 2025-06-02

## 2025-06-02 RX ORDER — PROPOFOL 10 MG/ML
INJECTION, EMULSION INTRAVENOUS AS NEEDED
Status: DISCONTINUED | OUTPATIENT
Start: 2025-06-02 | End: 2025-06-02

## 2025-06-02 RX ORDER — FENTANYL CITRATE 50 UG/ML
50 INJECTION, SOLUTION INTRAMUSCULAR; INTRAVENOUS EVERY 5 MIN PRN
Status: ACTIVE | OUTPATIENT
Start: 2025-06-02 | End: 2025-06-02

## 2025-06-02 RX ORDER — LIDOCAINE HYDROCHLORIDE 10 MG/ML
0.1 INJECTION, SOLUTION EPIDURAL; INFILTRATION; INTRACAUDAL; PERINEURAL ONCE
Status: DISCONTINUED | OUTPATIENT
Start: 2025-06-02 | End: 2025-06-03 | Stop reason: HOSPADM

## 2025-06-02 RX ORDER — SODIUM CHLORIDE, SODIUM LACTATE, POTASSIUM CHLORIDE, CALCIUM CHLORIDE 600; 310; 30; 20 MG/100ML; MG/100ML; MG/100ML; MG/100ML
50 INJECTION, SOLUTION INTRAVENOUS CONTINUOUS
Status: ACTIVE | OUTPATIENT
Start: 2025-06-02 | End: 2025-06-02

## 2025-06-02 RX ORDER — LABETALOL HYDROCHLORIDE 5 MG/ML
5 INJECTION, SOLUTION INTRAVENOUS ONCE AS NEEDED
Status: ACTIVE | OUTPATIENT
Start: 2025-06-02 | End: 2025-06-02

## 2025-06-02 RX ORDER — FENTANYL CITRATE 50 UG/ML
25 INJECTION, SOLUTION INTRAMUSCULAR; INTRAVENOUS EVERY 5 MIN PRN
Status: ACTIVE | OUTPATIENT
Start: 2025-06-02 | End: 2025-06-02

## 2025-06-02 RX ADMIN — SODIUM CHLORIDE, SODIUM LACTATE, POTASSIUM CHLORIDE, AND CALCIUM CHLORIDE 50 ML/HR: 600; 310; 30; 20 INJECTION, SOLUTION INTRAVENOUS at 08:05

## 2025-06-02 RX ADMIN — PROPOFOL 125 MCG/KG/MIN: 10 INJECTION, EMULSION INTRAVENOUS at 09:04

## 2025-06-02 RX ADMIN — PROPOFOL 80 MG: 10 INJECTION, EMULSION INTRAVENOUS at 09:03

## 2025-06-02 ASSESSMENT — COLUMBIA-SUICIDE SEVERITY RATING SCALE - C-SSRS
6. HAVE YOU EVER DONE ANYTHING, STARTED TO DO ANYTHING, OR PREPARED TO DO ANYTHING TO END YOUR LIFE?: NO
2. HAVE YOU ACTUALLY HAD ANY THOUGHTS OF KILLING YOURSELF?: NO
1. IN THE PAST MONTH, HAVE YOU WISHED YOU WERE DEAD OR WISHED YOU COULD GO TO SLEEP AND NOT WAKE UP?: NO

## 2025-06-02 ASSESSMENT — PAIN SCALES - GENERAL
PAINLEVEL_OUTOF10: 0 - NO PAIN

## 2025-06-02 ASSESSMENT — PAIN - FUNCTIONAL ASSESSMENT
PAIN_FUNCTIONAL_ASSESSMENT: 0-10

## 2025-06-02 ASSESSMENT — PAIN DESCRIPTION - DESCRIPTORS: DESCRIPTORS: PRESSURE

## 2025-06-02 NOTE — ANESTHESIA POSTPROCEDURE EVALUATION
Patient: Maicol Kirby Jr.    Procedure Summary       Date: 06/02/25 Room / Location: [facility]    Anesthesia Start: 0855 Anesthesia Stop: 0933    Procedures:       COLONOSCOPY      EGD Diagnosis:       History of colon polyps      Rectal bleed      Dysphagia, unspecified type      Bloating    Scheduled Providers: Leighton Hargrove MD; DELISA Stevenson; Andre Sheikh MD Responsible Provider: Andre Sheikh MD    Anesthesia Type: MAC ASA Status: 3            Anesthesia Type: MAC    Vitals Value Taken Time   BP 97/67 06/02/25 09:50   Temp 36.2 °C (97.2 °F) 06/02/25 09:30   Pulse 68 06/02/25 09:50   Resp 17 06/02/25 09:50   SpO2 95 % 06/02/25 09:50       Anesthesia Post Evaluation    Patient location during evaluation: PACU  Patient participation: complete - patient participated  Level of consciousness: awake  Pain management: adequate  Airway patency: patent  Cardiovascular status: acceptable  Respiratory status: acceptable  Hydration status: acceptable  Postoperative Nausea and Vomiting: none        There were no known notable events for this encounter.

## 2025-06-02 NOTE — ANESTHESIA PREPROCEDURE EVALUATION
Patient: Maicol Kirby Jr.    Procedure Information       Date/Time: 06/02/25 0830    Scheduled providers: Leighton Hargrove MD; DELISA Stevenson; Andre Sheikh MD    Procedures:       COLONOSCOPY      EGD    Location: [facility]            Relevant Problems   Cardiac   (+) Arteriosclerosis of coronary artery   (+) Hypertension   (+) Mixed hyperlipidemia      Pulmonary   (+) TREVON (obstructive sleep apnea)      Neuro   (+) Anxiety      GI   (+) GERD (gastroesophageal reflux disease)   (+) IBS (irritable bowel syndrome)      Liver   (+) Cholelithiasis      Endocrine   (+) Diabetes mellitus (Multi)   (+) Hypothyroidism   (+) Type 2 diabetes mellitus with diabetic neuropathy, without long-term current use of insulin      Circulatory   (+) S/P CABG (coronary artery bypass graft)       Clinical information reviewed:                   NPO Detail:  No data recorded     Physical Exam    Airway  Mallampati: II  TM distance: >3 FB  Neck ROM: full     Cardiovascular    Dental    Pulmonary    Abdominal            Anesthesia Plan    History of general anesthesia?: yes  History of complications of general anesthesia?: no    ASA 3     MAC     intravenous induction   Anesthetic plan and risks discussed with patient.    Plan discussed with DELISA.

## 2025-06-02 NOTE — POST-PROCEDURE NOTE
VSS. IV INFUSING WELL. Denies PAIN  MARIVEL. PO WELL. NO N/V AT THIS TIME. FAMILY PRESENT. Md in to see pt.  DISCHARGE INSTRUCTIONS REVIEWED AND UNDERSTOOD.IV DISCONTINUED.  D/C VIA WHEELCHAIR.

## 2025-06-02 NOTE — PRE-PROCEDURE NOTE
ADMITTED TO hospitals. ALERT AND ORIENTED. GAIT STEADY. NO NOTED RESP. DISTRESS. ORIENTED TO THE SURROUNDINGS. VSS. . IV STARTED AND FLUIDS STARTED AT [facility]. STATES FAMILY WAS FOLLOWING HIM IN, I HAVE NOT SEEN HIS WIFE OR SON AT THIS POINT. ANESTHESIA IN TO SEE PT. CALL [facility] IN REACH. READY FOR PROCEDURE. NO NOTED DISTRESS.

## 2025-06-02 NOTE — DISCHARGE INSTRUCTIONS
Instructions  Patient Instructions after a Colonoscopy, Upper GI      The anesthetics, sedatives or narcotics which were given to you today will be acting in your body for the next 24 hours, so you might feel a little sleepy or groggy.  This feeling should slowly wear off. Carefully read and follow the instructions.      You received sedation today:  - Do not drive or operate any machinery or power tools of any kind.   - No alcoholic beverages today, not even beer or wine.  - Do not make any important decisions or sign any legal documents.  - No over the counter medications that contain alcohol or that may cause drowsiness.  - Do not make any important decisions or sign any legal documents.     While it is common to experience mild to moderate abdominal distention, gas, or belching after your procedure, if any of these symptoms occur following discharge from the GI Lab or within one week of having your procedure, call the [facility] to be advised whether a visit to your nearest Urgent Care or Emergency Department is indicated.  Take this paper with you if you go.      - If you develop an allergic reaction to the medications that were given during your procedure such as difficulty breathing, rash, hives, severe nausea, vomiting or lightheadedness.- If you experience chest pain, shortness of breath, severe abdominal pain, fevers and chills.     -If you develop signs and symptoms of bleeding such as blood in your spit, if your stools turn black, tarry, or bloody     - If you have not urinated within 8 hours following your procedure.- If your IV site becomes painful, red, inflamed, or looks infected.     If you received a biopsy/polypectomy/sphincterotomy the following instructions apply below:     - Do not use Aspirin containing products, non-steroidal medications or anti-coagulants for one week following your procedure. (Examples of these types of medications are: Advil, Arthrotec, Aleve, Coumadin,  Ecotrin, Heparin, Ibuprofen, Indocin, Motrin, Naprosyn, Nuprin, Plavix, Vioxx, and Voltarin, or their generic forms.  This list is not all-inclusive.  Check with your physician or pharmacist before resuming medications.)      - Eat a soft diet today.  Avoid foods that are poorly digested for the next 24 hours.  These foods would include: nuts, beans, lettuce, red meats, and fried foods.       - Start with liquids and advance your diet as tolerated, gradually work up to eating solids.      - Do not have a Barium Study or Enema for one week.     Your physician recommends the additional following instructions:     Colonoscopy: Resume your previous diet, continue your present medications, a repeated colonoscopy will be determined based on pathology result. Return to normal activity tomorrow.      Upper GI endoscopy: Resume your previous diet, continue your medications, recommendation to repeat upper endoscopy in three months for follow up of Armstrong's ablation. Return to normal activity tomorrow.

## 2025-06-12 LAB
LABORATORY COMMENT REPORT: NORMAL
PATH REPORT.FINAL DX SPEC: NORMAL
PATH REPORT.GROSS SPEC: NORMAL
PATH REPORT.RELEVANT HX SPEC: NORMAL
PATH REPORT.TOTAL CANCER: NORMAL

## 2025-07-08 ENCOUNTER — APPOINTMENT (OUTPATIENT)
Dept: ENDOCRINOLOGY | Facility: CLINIC | Age: 65
End: 2025-07-08
Payer: MEDICARE